# Patient Record
Sex: MALE | Race: OTHER | URBAN - METROPOLITAN AREA
[De-identification: names, ages, dates, MRNs, and addresses within clinical notes are randomized per-mention and may not be internally consistent; named-entity substitution may affect disease eponyms.]

---

## 2019-01-03 ENCOUNTER — OFFICE VISIT (OUTPATIENT)
Dept: FAMILY MEDICINE CLINIC | Age: 59
End: 2019-01-03

## 2019-01-03 VITALS
HEART RATE: 58 BPM | WEIGHT: 164 LBS | DIASTOLIC BLOOD PRESSURE: 58 MMHG | BODY MASS INDEX: 28 KG/M2 | RESPIRATION RATE: 16 BRPM | HEIGHT: 64 IN | TEMPERATURE: 98.4 F | SYSTOLIC BLOOD PRESSURE: 141 MMHG

## 2019-01-03 DIAGNOSIS — M25.562 CHRONIC PAIN OF LEFT KNEE: ICD-10-CM

## 2019-01-03 DIAGNOSIS — G89.29 CHRONIC PAIN OF LEFT KNEE: ICD-10-CM

## 2019-01-03 DIAGNOSIS — K04.7 DENTAL ABSCESS: Primary | ICD-10-CM

## 2019-01-03 RX ORDER — MELOXICAM 15 MG/1
15 TABLET ORAL DAILY
Qty: 30 TAB | Refills: 3 | Status: SHIPPED | OUTPATIENT
Start: 2019-01-03

## 2019-01-03 RX ORDER — AMOXICILLIN 500 MG/1
500 CAPSULE ORAL 3 TIMES DAILY
Qty: 30 CAP | Refills: 0 | Status: SHIPPED | OUTPATIENT
Start: 2019-01-03 | End: 2019-01-13

## 2019-01-03 NOTE — PROGRESS NOTES
HISTORY OF PRESENT ILLNESS Meghna Henderson is a 66 y.o. male. HPI Patient states he is having knee pain, left sided and radiated down to the left foot. The right knee is also a little painful but not too much. Has had this problem for about 1 year. So far takes some medications from time to time to help with the symptoms. Has a bad tooth for about 6 months, it is in the left side,  For the past few weeks has gotten swollen and tender. Review of Systems Constitutional: Negative for chills, fever and weight loss. HENT: Negative for congestion, ear discharge, ear pain, hearing loss, sore throat and tinnitus. Toothache Eyes: Negative for blurred vision, double vision and photophobia. Respiratory: Negative for cough, shortness of breath and wheezing. Cardiovascular: Negative for chest pain, palpitations and orthopnea. Gastrointestinal: Negative for abdominal pain, constipation, diarrhea, heartburn, nausea and vomiting. Musculoskeletal: Positive for joint pain. Neurological: Negative for dizziness and headaches. Psychiatric/Behavioral: Negative for depression. The patient is not nervous/anxious. /58 (BP 1 Location: Right arm, BP Patient Position: Sitting)   Pulse (!) 58   Temp 98.4 °F (36.9 °C) (Oral)   Resp 16   Ht 5' 4.17\" (1.63 m)   Wt 164 lb (74.4 kg)   BMI 28.00 kg/m² Physical Exam  
Constitutional: He is oriented to person, place, and time. He appears well-developed and well-nourished. HENT:  
Head: Normocephalic. Right Ear: External ear normal.  
Left Ear: External ear normal.  
Gum swelling on left lower maxillar area Eyes: Conjunctivae and EOM are normal. Pupils are equal, round, and reactive to light. Neck: Normal range of motion. No thyromegaly present. Cardiovascular: Normal rate, regular rhythm and normal heart sounds. No murmur heard.  
Pulmonary/Chest: Effort normal and breath sounds normal. No respiratory distress. He has no wheezes. He has no rales. Abdominal: Soft. Bowel sounds are normal. He exhibits no distension. Musculoskeletal: Normal range of motion. He exhibits tenderness. He exhibits no edema. Neurological: He is alert and oriented to person, place, and time. He has normal reflexes. Skin: Skin is warm. No erythema. ASSESSMENT and PLAN Diagnoses and all orders for this visit: 1. Dental abscess 
-     amoxicillin (AMOXIL) 500 mg capsule; Take 1 Cap by mouth three (3) times daily for 10 days. 2. Chronic pain of left knee 
-     meloxicam (MOBIC) 15 mg tablet; Take 1 Tab by mouth daily. Contact information of dentist who accept uninsured patients given to patient Will treat with amoxicillin. Start meloxicam for his knee, advise to use ice daily x 10 minutes

## 2019-01-03 NOTE — PATIENT INSTRUCTIONS
Winferd Asher de rodilla: Instrucciones de cuidado - [ Knee Pain or Injury: Care Instructions ] Instrucciones de cuidado Las lesiones son Mireles Deangelo causa común de los problemas de [address] [place] [address]. Las lesiones repentinas (agudas) pueden estar causadas por un impacto directo a la rodilla. También pueden estar causadas por un giro, flexión o caída anormales sobre la rodilla. El dolor, los moretones o la hinchazón pueden ser intensos y pueden comenzar dentro de minutos de ocurrida la lesión. El uso excesivo es otra causa de dolor en la rodilla. Otras causas son subir escaleras, arrodillarse y hacer otras actividades en las que se R Marika 11. El desgaste cotidiano, especialmente al envejecer, también puede causar dolor de rodilla. El descanso junto con el tratamiento en el [facility] suelen aliviar el dolor y permitir que sane la rodilla. Ronald si usted tiene jarvis lesión importante en la rodilla, podría necesitar exámenes y tratamiento. La atención de seguimiento es jarvis parte clave de irving tratamiento y seguridad. Asegúrese de hacer y acudir a todas las citas, y llame a irving médico si está teniendo problemas. También es jarvis buena idea saber los resultados de andrei exámenes y mantener jarvis lista de los medicamentos que vitaliy. Cómo puede cuidarse en el [facility]? · Sea ever con los medicamentos. Danielle y siga todas las indicaciones en la etiqueta. ? Si el médico le recetó analgésicos (medicamentos para el dolor), tómelos según las indicaciones. ? Si no está tomando un analgésico recetado, pregúntele a irving médico si puede henrik joseph de [employer]. · Descanse y proteja irving rodilla. Deje de hacer cualquier actividad que pudiera causarle dolor. · Aplíquese hielo o jarvis compresa fría sobre la rodilla por entre 10 y 21 minutos cada vez. Póngase un paño fernandez entre el hielo y la piel.  
· Apoye la rodilla adolorida sobre jarvis almohada cuando se aplica hielo o en cualquier momento que se siente o acueste seble los 3 días siguientes. Trate de mantenerla por encima del nivel del corazón. [place] ayudará a reducir la hinchazón. · Si irving rodilla no está hinchada, puede aplicar calor húmedo, jarvis almohadilla térmica o un paño tibio sobre terrell. · Si irving médico le recomienda un vendaje elástico, Page Landy de compresión u otro tipo de soporte para la rodilla, úselos según se lo indique. · [address] irving médico acerca de cuánto peso puede poner sobre la pierna. Use un bastón, muletas o un andador doreen aspen se lo hayan indicado. · [address] de irving médico acerca de la actividad seble irving proceso de sanación. Si puede hacer ejercicio leve, aumente la actividad lentamente. · [place] y [place] un peso saludable. El exceso de peso puede sobrecargar las articulaciones, especialmente las rodillas y las caderas, y empeorar el dolor. [place] incluso unas pocas libras podría ayudar. Cuándo debe pedir ayuda? Llame al 911 en cualquier momento que considere que necesita atención de [place]. Por ejemplo, llame si: 
  · Tiene síntomas de un coágulo de gavin en el pulmón (llamado embolia pulmonar). Estos pueden incluir: ? Dolor repentino en Dorinda Deluna. ? Problemas para respirar. ? Toser gavin.  
Wanda Maryse a irving médico ahora mismo o busque atención médica inmediata si: 
  · El dolor aumenta o es muy intenso.  
  · La pierna o el pie se pone frío o cambia de color.  
  · No puede ponerse de pie o poner peso sobre la rodilla.  
  · La rodilla parece torcida o deformada.  
  · No puede  la rodilla.  
  · Tiene señales de infección, tales aspen: ? Aumento del dolor, la hinchazón, el enrojecimiento o la temperatura. ? Vetas rojizas que comienzan en la rodilla. ? Pus que sale de alguna parte de la rodilla. ? [place].  
  · Tiene señales de un coágulo de gavin en la pierna (llamado trombosis venosa profunda), tales aspen: ? Dolor en la pantorrilla, el muslo, la praveen o detrás de la rodilla. ? Enrojecimiento e hinchazón en la pierna o la praveen.  
 Preste especial atención a los cambios en irving leonila y asegúrese de comunicarse con irving médico si: 
  · Tiene hormigueo, debilidad o entumecimiento en la rodilla.  
  · Tiene cualquier síntoma nuevo, por ejemplo, hinchazón.  
  · Tiene moretones por jarvis lesión de rodilla que mancia más de 2 semanas.  
  · No mejora aspen se esperaba. Dónde puede encontrar más información en inglés? Wilbert Wister a http://[email or web address].info/. Teofilo Ybarra U102 en la búsqueda para aprender más acerca de \"Dolor o lesión de rodilla: Instrucciones de cuidado - [ Knee Pain or Injury: Care Instructions ]. \" 
Revisado: 20 noviembre, 2017 Versión del contenido: 11.8 © 9846-8530 [employer]wise[employer]. Las instrucciones de cuidado fueron adaptadas bajo licencia por [employer] (which disclaims liability or warranty for this information). Si usted tiene [place] afección médica o sobre estas instrucciones, siempre pregunte a irving profesional de leonila. [employer]wise[employer] niega toda garantía o responsabilidad por irving uso de esta información. Absceso dental: Instrucciones de cuidado - [ Abscessed Tooth: Care Instructions ] Instrucciones de cuidado Un absceso dental es un diente que tiene jarvis bolsa de pus en los tejidos que lo rodean. El pus se forma cuando el cuerpo trata de combatir jarvis infección causada por bacterias. Si el pus no puede drenar, se forma un absceso. Un absceso dental puede causar enrojecimiento e hinchazón de las encías y dolor pulsante, especialmente al [employer]. Usted podría tener un mal sabor en la boca y [place], y la mandíbula podría hincharse. El daño en un diente, caries dentales sin tratar o la enfermedad de las encías pueden causar un absceso dental. 
Un absceso dental debe ser tratado por un profesional dental de inmediato. Si no se trata, la infección podría extenderse a otras partes del cuerpo. El dentista le recetará antibióticos para detener la infección. También podría hacer un orificio en el diente o cortar (con lanceta) en el interior de la boca para abrir el absceso de manera que la infección pueda drenar y así aliviar el dolor. Usted podría necesitar un tratamiento de conducto radicular, que trata de salvar el diente al extraer la pulpa infectada y reemplazarla con un medicamento curativo y/o un relleno. Si estos tratamientos no resultan efectivos, podrían extraerle el diente. La atención de seguimiento es jarvis parte clave de irving tratamiento y seguridad. Asegúrese de hacer y acudir a todas las citas, y llame a irving médico si está teniendo problemas. También es jarvis buena idea saber los resultados de andrei exámenes y mantener jarvis lista de los medicamentos que vitaliy. Cómo puede cuidarse en el hogar? · Para reducir el dolor y la hinchazón de la barry y la [place], aplíquese hielo o jarvis compresa fría en la parte externa de la mejilla seble 10 a 20 minutos cada vez. Póngase un paño fernandez entre el hielo y la piel. · [employer] analgésicos (medicamentos para el dolor) exactamente según las indicaciones. ? Si el médico le recetó un analgésico, tómelo según las indicaciones. ? Si no está tomando un analgésico recetado, pregúntele a irving médico si puede henrik joseph de [employer]. · [address] No deje de tomarlos por el hecho de sentirse mejor. Debe henrik todos los antibióticos hasta terminarlos. Para prevenir un absceso dental 
· Cepíllese los dientes y use hilo dental todos los días, y hágase revisiones dentales periódicas. · Consuma jarvis dieta saludable y evite alimentos y bebidas azucarados. · No fume, no utilice cigarrillos electrónicos que contengan nicotina ni use tabaco para mascar. El tabaco y la nicotina retrasan la sanación.  El tabaco [address] riesgo de enfermedad de las encías y de cáncer de boca y garganta. Si necesita ayuda para dejar de fumar, hable con irving médico sobre programas y medicamentos para dejar de fumar. Pueden aumentar andrei probabilidades de dejar el hábito para siempre. Cuándo debe pedir ayuda? Llame al 911 en cualquier momento que considere que necesita atención de [place]. Por ejemplo, llame si: 
  · Tiene dificultad para respirar.  
 Llame a irving médico ahora mismo o busque atención médica inmediata si: 
  · Tiene nuevos o peores síntomas de infección, tales aspen: ? Aumento del dolor, la hinchazón, la temperatura o el enrojecimiento. ? Vetas rojizas que salen de la ivett. ? Pus que sale de la ivett. ? Lynn Furrow especial atención a los cambios en irving leonila y asegúrese de comunicarse con irving médico si: 
  · No mejora aspen se esperaba. Dónde puede encontrar más información en inglés? Kirsten Bobo a [email or web address].[email or web address] Zabrina Laureanoon T096 en la búsqueda para aprender más acerca de \"Absceso dental: Instrucciones de cuidado - [ Abscessed Tooth: Care Instructions ]. \" 
Revisado: 28 marzo, 2018 Versión del contenido: 11.8 © 2134-5971 [employer]wise, Incorporated. Las instrucciones de cuidado fueron adaptadas bajo licencia por [employer] (which disclaims liability or warranty for this information). Si usted tiene [place] afección médica o sobre estas instrucciones, siempre pregunte a irving profesional de leonila. [employer]wise[employer] niega toda garantía o responsabilidad por irving uso de esta información.